# Patient Record
Sex: FEMALE | URBAN - METROPOLITAN AREA
[De-identification: names, ages, dates, MRNs, and addresses within clinical notes are randomized per-mention and may not be internally consistent; named-entity substitution may affect disease eponyms.]

---

## 2019-06-03 ENCOUNTER — OFFICE VISIT (OUTPATIENT)
Dept: URBAN - METROPOLITAN AREA CLINIC 22 | Facility: CLINIC | Age: 54
End: 2019-06-03
Payer: COMMERCIAL

## 2019-06-03 DIAGNOSIS — H52.03 HYPERMETROPIA, BILATERAL: Primary | ICD-10-CM

## 2019-06-03 PROCEDURE — 92015 DETERMINE REFRACTIVE STATE: CPT | Performed by: OPTOMETRIST

## 2019-06-03 PROCEDURE — 92014 COMPRE OPH EXAM EST PT 1/>: CPT | Performed by: OPTOMETRIST

## 2019-06-03 ASSESSMENT — VISUAL ACUITY
OS: 20/20
OD: 20/20

## 2019-06-03 ASSESSMENT — INTRAOCULAR PRESSURE
OD: 14
OS: 13

## 2019-06-03 ASSESSMENT — KERATOMETRY
OS: 49.70
OD: 46.16

## 2019-06-03 NOTE — IMPRESSION/PLAN
Impression: Dry eye syndrome of bilateral lacrimal glands: H04.123. Plan: Take omega 3s, flaxseed, krill or fish oil consistently for at least 60 days. Minimum 1000 mg per day recommended 2,000 mg. Pt reviewed that there is no cure and daily maintenance is needed. Artificial Tears QOD or more OU is recommended. AFT's can be purchased OTC. Discussed with the patient they may use them as often as needed.

## 2021-06-04 ENCOUNTER — OFFICE VISIT (OUTPATIENT)
Dept: URBAN - METROPOLITAN AREA CLINIC 24 | Facility: CLINIC | Age: 56
End: 2021-06-04
Payer: COMMERCIAL

## 2021-06-04 DIAGNOSIS — H52.4 PRESBYOPIA: ICD-10-CM

## 2021-06-04 DIAGNOSIS — H04.123 DRY EYE SYNDROME OF BILATERAL LACRIMAL GLANDS: Primary | ICD-10-CM

## 2021-06-04 PROCEDURE — 92004 COMPRE OPH EXAM NEW PT 1/>: CPT | Performed by: STUDENT IN AN ORGANIZED HEALTH CARE EDUCATION/TRAINING PROGRAM

## 2021-06-04 ASSESSMENT — INTRAOCULAR PRESSURE
OD: 11
OS: 11

## 2021-06-04 ASSESSMENT — KERATOMETRY
OS: 45.67
OD: 45.37

## 2021-06-04 ASSESSMENT — VISUAL ACUITY
OD: 20/20
OS: 20/20

## 2021-06-04 NOTE — IMPRESSION/PLAN
Impression: Presbyopia: H52.4. Plan: Patient considering Rx in the future. Generated SRx but not printed for patient today.

## 2022-05-23 ENCOUNTER — OFFICE VISIT (OUTPATIENT)
Dept: URBAN - METROPOLITAN AREA CLINIC 24 | Facility: CLINIC | Age: 57
End: 2022-05-23
Payer: COMMERCIAL

## 2022-05-23 DIAGNOSIS — H52.03 HYPERMETROPIA, BILATERAL: ICD-10-CM

## 2022-05-23 DIAGNOSIS — H04.123 DRY EYE SYNDROME OF BILATERAL LACRIMAL GLANDS: Primary | ICD-10-CM

## 2022-05-23 DIAGNOSIS — H52.4 PRESBYOPIA: ICD-10-CM

## 2022-05-23 PROCEDURE — 92014 COMPRE OPH EXAM EST PT 1/>: CPT | Performed by: OPTOMETRIST

## 2022-05-23 ASSESSMENT — INTRAOCULAR PRESSURE
OD: 15
OS: 15

## 2022-05-23 ASSESSMENT — VISUAL ACUITY
OS: 20/20
OD: 20/20

## 2022-05-23 NOTE — IMPRESSION/PLAN
Impression: Dry eye syndrome of bilateral lacrimal glands: H04.123. Plan: Recommend consistent use of afts.

## 2023-07-17 ENCOUNTER — OFFICE VISIT (OUTPATIENT)
Dept: URBAN - METROPOLITAN AREA CLINIC 24 | Facility: CLINIC | Age: 58
End: 2023-07-17
Payer: COMMERCIAL

## 2023-07-17 DIAGNOSIS — H04.123 DRY EYE SYNDROME OF BILATERAL LACRIMAL GLANDS: ICD-10-CM

## 2023-07-17 DIAGNOSIS — H02.88B MEIBOMIAN GLAND DYSFUNCTION LEFT EYE, UPPER AND LOWER EYELIDS: ICD-10-CM

## 2023-07-17 DIAGNOSIS — H16.143 PUNCTATE KERATITIS, BILATERAL: Primary | ICD-10-CM

## 2023-07-17 DIAGNOSIS — H02.88A MEIBOMIAN GLAND DYSFNCT RIGHT EYE, UPPER AND LOWER EYELIDS: ICD-10-CM

## 2023-07-17 PROCEDURE — 92134 CPTRZ OPH DX IMG PST SGM RTA: CPT | Performed by: STUDENT IN AN ORGANIZED HEALTH CARE EDUCATION/TRAINING PROGRAM

## 2023-07-17 PROCEDURE — 92014 COMPRE OPH EXAM EST PT 1/>: CPT | Performed by: STUDENT IN AN ORGANIZED HEALTH CARE EDUCATION/TRAINING PROGRAM

## 2023-07-17 ASSESSMENT — INTRAOCULAR PRESSURE
OD: 16
OS: 16

## 2023-07-17 ASSESSMENT — KERATOMETRY
OS: 45.61
OD: 45.34

## 2023-07-17 NOTE — IMPRESSION/PLAN
Impression: Punctate keratitis, bilateral: H16.143. mod MGD OU Plan: Dry eyes contribute to the patient's complaints. Patient ed condition and contribution to complaint. Cont art tears qid prn OU and restart warm compresses 2x/day with lid massage. Add o3s. Discussed option of dry eye consult to consider IPL if no improvement after AT/WC regimen.